# Patient Record
Sex: MALE | Race: OTHER | Employment: FULL TIME | URBAN - METROPOLITAN AREA
[De-identification: names, ages, dates, MRNs, and addresses within clinical notes are randomized per-mention and may not be internally consistent; named-entity substitution may affect disease eponyms.]

---

## 2023-09-20 ENCOUNTER — APPOINTMENT (OUTPATIENT)
Dept: GENERAL RADIOLOGY | Age: 38
End: 2023-09-20

## 2023-09-20 ENCOUNTER — HOSPITAL ENCOUNTER (EMERGENCY)
Age: 38
Discharge: HOME | End: 2023-09-20
Attending: GENERAL PRACTICE

## 2023-09-20 VITALS
HEIGHT: 60 IN | OXYGEN SATURATION: 97 % | WEIGHT: 200 LBS | DIASTOLIC BLOOD PRESSURE: 90 MMHG | RESPIRATION RATE: 16 BRPM | TEMPERATURE: 98 F | HEART RATE: 81 BPM | BODY MASS INDEX: 39.27 KG/M2 | SYSTOLIC BLOOD PRESSURE: 145 MMHG

## 2023-09-20 DIAGNOSIS — S90.31XA CONTUSION OF RIGHT FOOT, INITIAL ENCOUNTER: Primary | ICD-10-CM

## 2023-09-20 PROCEDURE — 73630 X-RAY EXAM OF FOOT: CPT

## 2023-09-20 PROCEDURE — 6370000000 HC RX 637 (ALT 250 FOR IP)

## 2023-09-20 PROCEDURE — 99283 EMERGENCY DEPT VISIT LOW MDM: CPT

## 2023-09-20 RX ORDER — HYDROCODONE BITARTRATE AND ACETAMINOPHEN 5; 325 MG/1; MG/1
1 TABLET ORAL
Status: COMPLETED | OUTPATIENT
Start: 2023-09-20 | End: 2023-09-20

## 2023-09-20 RX ADMIN — HYDROCODONE BITARTRATE AND ACETAMINOPHEN 1 TABLET: 5; 325 TABLET ORAL at 20:38

## 2023-09-20 ASSESSMENT — LIFESTYLE VARIABLES
HOW OFTEN DO YOU HAVE A DRINK CONTAINING ALCOHOL: NEVER
HOW MANY STANDARD DRINKS CONTAINING ALCOHOL DO YOU HAVE ON A TYPICAL DAY: PATIENT DOES NOT DRINK

## 2023-09-20 ASSESSMENT — ENCOUNTER SYMPTOMS
ABDOMINAL PAIN: 0
COUGH: 0
ABDOMINAL DISTENTION: 0
VOMITING: 0
CHEST TIGHTNESS: 0
BACK PAIN: 0
COLOR CHANGE: 0
SHORTNESS OF BREATH: 0
NAUSEA: 0
WHEEZING: 0
CONSTIPATION: 0

## 2023-09-20 ASSESSMENT — PAIN DESCRIPTION - LOCATION: LOCATION: FOOT

## 2023-09-20 ASSESSMENT — PAIN SCALES - GENERAL: PAINLEVEL_OUTOF10: 9

## 2023-09-20 ASSESSMENT — PAIN DESCRIPTION - ORIENTATION: ORIENTATION: RIGHT

## 2023-09-21 NOTE — ED PROVIDER NOTES
Emergency Department Provider Note       PCP: No primary care provider on file. Age: 42 y.o. Sex: male     DISPOSITION Decision To Discharge 09/20/2023 09:11:45 PM       ICD-10-CM    1. Contusion of right foot, initial encounter  S90.31XA JEMIMA Fox DPM, [facility], [address] Decision Making     Complexity of Problems Addressed:  1 or more acute illnesses that pose a threat to life or bodily function. Data Reviewed and Analyzed:   I independently ordered and reviewed each unique test.     I interpreted the X-rays no bony abnormality noted on x-ray of patient's right foot, in agreement with radiologist interpretation. Discussion of management or test interpretation. Patient is a 20-year-old male who presents today due to right foot pain and bruising after striking on a table on Wednesday. Bruising is noted to the dorsum of the patient's right foot extending down to his second and third digits. He has normal dorsiflexion and plantarflexion despite pain. 2+ dorsalis pedis pulses are appreciated. Treated the patient here today with [place].  X-ray imaging shows no bony fracture or abnormality. I will treat the patient with a surgical shoe and give referral to podiatry for close follow-up. Conservative care measures were discussed. Patient verbalized understanding and agreement with the plan. Risk of Complications and/or Morbidity of Patient Management:  Prescription drug management performed. Shared medical decision making was utilized in creating the patients health plan today. ED Course as of 09/20/23 2114   Wed Sep 20, 2023   2107 XR IMPRESSION:  1. Soft tissue swelling along the dorsolateral of the foot. 2. No fracture. [KS]      ED Course User Index  [KS] JARET Cabrera       Is this patient to be included in the SEP-1 core measure due to severe sepsis or septic shock?  No Exclusion criteria - the patient is NOT to be included for SEP-1 Core

## 2023-09-21 NOTE — DISCHARGE INSTRUCTIONS
X-ray imaging of your foot shows no bony fracture or abnormality. Wear walking shoe for the next week. Rest, ice, and elevate your foot to decrease pain and swelling. Alternate Tylenol and ibuprofen as needed to help with your symptoms. Follow-up with podiatry for close reevaluation. If your symptoms change or worsen in any way, return immediately to the emergency department.

## 2023-09-21 NOTE — ED TRIAGE NOTES
Pt presents ambulatory from home w/ pain to right foot after injuring his foot on Wednesday of this week. Patient states he hit his foot on the corner of a table. Patient rates pain at 9 out of 10. Patient states he has been taking naproxen and tylenol at home to help with pain.